# Patient Record
(demographics unavailable — no encounter records)

---

## 2024-10-11 NOTE — DISCUSSION/SUMMARY
[Medication Risks Reviewed] : Medication risks reviewed [Surgical risks reviewed] : Surgical risks reviewed [de-identified] : reviewed the case and the imaging with the patient  lumbar ddd with L5-S1  discussion of the condition and treatment options cautions discussed questions answered discussion of natural history of the condition and what the next step would be conservativ care  referral to pain management is what I would recommend lesi  PT  naprosyn  FOR THE KIDNEYs she could speak to nephrology   if not getting better consider right sidedL5-S1 decompression surgery - now try conservative first

## 2024-10-11 NOTE — PHYSICAL EXAM
[Right lower extremity below knee] : right lower extremity below knee [Right lower extremity above knee] : right lower extremity above knee [] : mildly antalgic

## 2024-10-11 NOTE — HISTORY OF PRESENT ILLNESS
[] : yes [de-identified] : 9/30/24:  33 yo F -no reported injury, patient states pain starts in the right lower back and travels down the right leg. Patient reports increased N/T.  about 3 weeks  Pain intense in the right leg left leg is okay worse with certain position   tried alve/iburpfoen No PT/chiro/acupuncture No prior surgery /injections   xrays today: L spine - Loss of disc height at l5-S1  AP PELVIS -negative  No hx of cancer no loss of bb control   OCOA   10/11/24: here for fu - plan at last was "reviewed the case and the imaging with the patient  lumbar radiucopathy  discussion of the condition and treatment options cautions discussed questions answered discussion of natural history of the condition and what the next step would be MRi L spine/PT/MDP fu to review the MRi"  REMAINS DOWN THE RIGHT LEG  MRI l SPINE- SEE REPORT - [facility] - LARGE l5-s1 RIGHT SIDED DISC HERNIATION   NO RELE WITH mdp    [FreeTextEntry5] : MRI lumbar spine review today. Took MDP with no relief. Has not started PT.